# Patient Record
Sex: MALE
[De-identification: names, ages, dates, MRNs, and addresses within clinical notes are randomized per-mention and may not be internally consistent; named-entity substitution may affect disease eponyms.]

---

## 2022-10-03 PROBLEM — Z00.00 ENCOUNTER FOR PREVENTIVE HEALTH EXAMINATION: Status: ACTIVE | Noted: 2022-10-03

## 2022-10-04 ENCOUNTER — APPOINTMENT (OUTPATIENT)
Dept: UROLOGY | Facility: CLINIC | Age: 47
End: 2022-10-04

## 2022-10-04 VITALS
WEIGHT: 220 LBS | OXYGEN SATURATION: 95 % | SYSTOLIC BLOOD PRESSURE: 136 MMHG | RESPIRATION RATE: 18 BRPM | HEART RATE: 94 BPM | BODY MASS INDEX: 29.8 KG/M2 | HEIGHT: 72 IN | DIASTOLIC BLOOD PRESSURE: 87 MMHG

## 2022-10-04 DIAGNOSIS — N52.9 MALE ERECTILE DYSFUNCTION, UNSPECIFIED: ICD-10-CM

## 2022-10-04 PROCEDURE — 99203 OFFICE O/P NEW LOW 30 MIN: CPT

## 2022-10-04 NOTE — PHYSICAL EXAM
[General Appearance - Well Developed] : well developed [General Appearance - Well Nourished] : well nourished [General Appearance - In No Acute Distress] : no acute distress [] : no respiratory distress [Urethral Meatus] : meatus normal [Penis Abnormality] : normal uncircumcised penis [Testes Tenderness] : no tenderness of the testes [Testes Mass (___cm)] : there were no testicular masses [Oriented To Time, Place, And Person] : oriented to person, place, and time

## 2022-10-05 RX ORDER — SILDENAFIL 20 MG/1
20 TABLET ORAL
Qty: 50 | Refills: 1 | Status: ACTIVE | COMMUNITY
Start: 2022-10-04 | End: 1900-01-01

## 2022-10-09 LAB
PSA SERPL-MCNC: 1.14 NG/ML
TESTOST FREE SERPL-MCNC: 12.5 PG/ML
TESTOST SERPL-MCNC: 476 NG/DL

## 2022-10-09 NOTE — HISTORY OF PRESENT ILLNESS
[FreeTextEntry1] : c/o difficulty maintaining/ getting erections, libido ok, some AM erections/ able to penetrate [Erectile Dysfunction] : Erectile Dysfunction [None] : None

## 2022-10-09 NOTE — ASSESSMENT
[FreeTextEntry1] : Options discussed,  including further testing advise PSA/testosterone level.  Trial of sildenafil 20 mg tabs, 2-5 tabs po 1 hour before coitus prn, precautions reviewed

## 2022-11-08 ENCOUNTER — APPOINTMENT (OUTPATIENT)
Dept: UROLOGY | Facility: CLINIC | Age: 47
End: 2022-11-08

## 2022-11-25 ENCOUNTER — APPOINTMENT (OUTPATIENT)
Dept: UROLOGY | Facility: CLINIC | Age: 47
End: 2022-11-25